# Patient Record
Sex: MALE | Race: WHITE | NOT HISPANIC OR LATINO
[De-identification: names, ages, dates, MRNs, and addresses within clinical notes are randomized per-mention and may not be internally consistent; named-entity substitution may affect disease eponyms.]

---

## 2017-01-20 ENCOUNTER — APPOINTMENT (OUTPATIENT)
Dept: UROLOGY | Facility: CLINIC | Age: 72
End: 2017-01-20

## 2017-02-16 ENCOUNTER — APPOINTMENT (OUTPATIENT)
Dept: INFECTIOUS DISEASE | Facility: CLINIC | Age: 72
End: 2017-02-16

## 2017-05-26 ENCOUNTER — APPOINTMENT (OUTPATIENT)
Dept: UROLOGY | Facility: CLINIC | Age: 72
End: 2017-05-26

## 2018-11-27 ENCOUNTER — APPOINTMENT (OUTPATIENT)
Dept: UROLOGY | Facility: CLINIC | Age: 73
End: 2018-11-27
Payer: MEDICARE

## 2018-11-27 PROCEDURE — 99212 OFFICE O/P EST SF 10 MIN: CPT

## 2018-11-28 LAB
PSA SERPL-MCNC: 0.08 NG/ML
TESTOST SERPL-MCNC: 246.9 NG/DL

## 2019-12-20 ENCOUNTER — APPOINTMENT (OUTPATIENT)
Dept: UROLOGY | Facility: CLINIC | Age: 74
End: 2019-12-20
Payer: MEDICARE

## 2019-12-20 DIAGNOSIS — R30.0 DYSURIA: ICD-10-CM

## 2019-12-20 LAB
BILIRUB UR QL STRIP: NEGATIVE
CLARITY UR: CLEAR
COLLECTION METHOD: NORMAL
GLUCOSE UR-MCNC: NORMAL
HCG UR QL: 0.2 EU/DL
HGB UR QL STRIP.AUTO: NEGATIVE
KETONES UR-MCNC: NEGATIVE
LEUKOCYTE ESTERASE UR QL STRIP: NEGATIVE
NITRITE UR QL STRIP: NEGATIVE
PH UR STRIP: 5.5
PROT UR STRIP-MCNC: NEGATIVE
SP GR UR STRIP: 1.02

## 2019-12-20 PROCEDURE — 99213 OFFICE O/P EST LOW 20 MIN: CPT | Mod: 25

## 2019-12-20 PROCEDURE — 51798 US URINE CAPACITY MEASURE: CPT

## 2019-12-20 NOTE — HISTORY OF PRESENT ILLNESS
[FreeTextEntry1] : PAtient following up for history of prostate cancer, ED and passing a stone.   He has prior h/o high risk cancer treated with trimodal therapy 2010 - had  TURP x 2 for retention. he denies any urinary issues as detailed below. NO incontinence\par here for yearly f/u; no issues until ~10 days ago had severe constipation and pushed hard  - FOS a bit slower and some dysuria. No hematuria. \par For his ED  otherwise uses [facility].  \par

## 2019-12-20 NOTE — PHYSICAL EXAM
[General Appearance - Well Nourished] : well nourished [Abdomen Soft] : soft [General Appearance - Well Developed] : well developed [Abdomen Mass (___ Cm)] : no abdominal mass palpated [Abdomen Tenderness] : non-tender [Abdomen Hernia] : no hernia was discovered [Penis Abnormality] : normal uncircumcised penis [Urinary Bladder Findings] : the bladder was normal on palpation [Testes Tenderness] : no tenderness of the testes [Scrotum] : the scrotum was normal [Epididymis] : the epididymides were normal [Edema] : no peripheral edema [Testes Mass (___cm)] : there were no testicular masses [Exaggerated Use Of Accessory Muscles For Inspiration] : no accessory muscle use [Oriented To Time, Place, And Person] : oriented to person, place, and time [] : no respiratory distress [Normal Station and Gait] : the gait and station were normal for the patient's age [No Focal Deficits] : no focal deficits

## 2019-12-23 LAB
PSA SERPL-MCNC: 0.08 NG/ML
TESTOST SERPL-MCNC: 412 NG/DL

## 2021-05-21 ENCOUNTER — APPOINTMENT (OUTPATIENT)
Dept: UROLOGY | Facility: CLINIC | Age: 76
End: 2021-05-21
Payer: MEDICARE

## 2021-05-21 PROCEDURE — 99072 ADDL SUPL MATRL&STAF TM PHE: CPT

## 2021-05-21 PROCEDURE — 99212 OFFICE O/P EST SF 10 MIN: CPT

## 2021-05-23 NOTE — HISTORY OF PRESENT ILLNESS
[FreeTextEntry1] : PAtient following up for history of prostate cancer, ED and passing a stone.   He has prior h/o high risk cancer treated with trimodal therapy 2010 - had  TURP x 2 for retention. he denies any urinary issues as detailed below. NO incontinence\par here for yearly f/u; no issues until ~10 days ago had severe constipation and pushed hard  - FOS a bit slower and some dysuria. No hematuria. \par For his ED  otherwise uses [facility].  \par \par here for f/u. No urinary issues. Good FOS and no bothersome frequency or nocturia. No incontinence. \par [facility] works well. \par

## 2024-01-10 ENCOUNTER — APPOINTMENT (OUTPATIENT)
Dept: UROLOGY | Facility: CLINIC | Age: 79
End: 2024-01-10
Payer: MEDICARE

## 2024-01-10 DIAGNOSIS — Z85.46 PERSONAL HISTORY OF MALIGNANT NEOPLASM OF PROSTATE: ICD-10-CM

## 2024-01-10 DIAGNOSIS — R35.0 FREQUENCY OF MICTURITION: ICD-10-CM

## 2024-01-10 DIAGNOSIS — N52.9 MALE ERECTILE DYSFUNCTION, UNSPECIFIED: ICD-10-CM

## 2024-01-10 PROCEDURE — 99213 OFFICE O/P EST LOW 20 MIN: CPT

## 2024-01-10 RX ORDER — PAPAVER/PHENTOLAMINE/ALPROSTAD 150-5-50
150-5-50 VIAL (EA) INTRACAVERNOSAL
Qty: 5 | Refills: 3 | Status: ACTIVE | COMMUNITY
Start: 2024-01-10 | End: 1900-01-01

## 2024-01-10 NOTE — HISTORY OF PRESENT ILLNESS
[FreeTextEntry1] : Patient following up for history of prostate cancer, ED and passing a stone.   He has prior h/o high risk cancer treated with trimodal therapy 2010 - had  TURP x 2 for retention. he denies any urinary issues as detailed below. NO incontinence here for yearly f/u; no issues until ~10 days ago had severe constipation and pushed hard  - FOS a bit slower and some dysuria. No hematuria.  For his ED  otherwise uses [facility].    here for f/u. No urinary issues. Good FOS and no bothersome frequency or nocturia. No incontinence.  [facility] works well.   1/10/24 - haven't seen in a few years Has some increased frequency - 5-6 a day with some urgency and nocturia 2-3 times. Occasional low volume urge incontinence.  No dysuria or hematuria.  out of [facility] for ED  PVR 30cc

## 2024-01-11 LAB
APPEARANCE: CLEAR
BACTERIA: NEGATIVE /HPF
BILIRUBIN URINE: NEGATIVE
BLOOD URINE: NEGATIVE
CAST: 0 /LPF
COLOR: YELLOW
EPITHELIAL CELLS: 0 /HPF
GLUCOSE QUALITATIVE U: >=1000 MG/DL
KETONES URINE: ABNORMAL MG/DL
LEUKOCYTE ESTERASE URINE: NEGATIVE
MICROSCOPIC-UA: NORMAL
NITRITE URINE: NEGATIVE
PH URINE: 5.5
PROTEIN URINE: NORMAL MG/DL
PSA SERPL-MCNC: 0.06 NG/ML
RED BLOOD CELLS URINE: 0 /HPF
SPECIFIC GRAVITY URINE: >1.03
UROBILINOGEN URINE: 0.2 MG/DL
WHITE BLOOD CELLS URINE: 1 /HPF

## 2024-01-12 LAB — BACTERIA UR CULT: NORMAL

## 2024-01-16 RX ORDER — ALPROSTADIL 500 UG/ML
500 INJECTION, SOLUTION INTRAVASCULAR; INTRAVENOUS
Qty: 10 | Refills: 5 | Status: ACTIVE | COMMUNITY
Start: 2019-12-20 | End: 1900-01-01

## 2024-01-23 ENCOUNTER — TRANSCRIPTION ENCOUNTER (OUTPATIENT)
Age: 79
End: 2024-01-23